# Patient Record
(demographics unavailable — no encounter records)

---

## 2024-12-30 NOTE — PHYSICAL EXAM
[Chaperone Present] : A chaperone was present in the examining room during all aspects of the physical examination [Labia Majora] : normal [Labia Minora] : normal [Discharge] : a  ~M vaginal discharge was present [Moderate] : moderate [White] : white [Curds] : curd-like [Normal] : normal [Uterine Adnexae] : normal

## 2024-12-31 NOTE — PLAN
[FreeTextEntry1] : vaginitis-rx sent-cultures taken rest of exam normal pelvic floor PT-numbers given f/u prn   agree with pa a/p as covering md - nt

## 2024-12-31 NOTE — HISTORY OF PRESENT ILLNESS
[FreeTextEntry1] : 28 yo pp 5months p1001 no meds-stopped bc still feeling vaginal discomfort -with dyspareunia both introital and deep since delivery feels "tight" in addition last few days having a curdy discharge with itching

## 2025-05-22 NOTE — HISTORY OF PRESENT ILLNESS
[FreeTextEntry1] : 29 y/o p1001 LMP 3/9/25 here for evaluation of secondary amenorrhea.  no bleeding, pain , thinks has yeast, took augmentin for 10 days.  nsd x 1, 6-2 lbs  no med or surgical hx nkda  dy neg, declines expanded carrier testing  non smoker

## 2025-05-22 NOTE — PROCEDURE
[Transvaginal OB Sonogram] : Transvaginal OB Sonogram [Intrauterine Pregnancy] : intrauterine pregnancy [Yolk Sac] : yolk sac present [Fetal Heart] : fetal heart present [Transvaginal OB Sonogram WNL] : Transvaginal OB Sonogram WNL [FreeTextEntry1] : single viable iup crl 10.5 wks, no ff, no masses, c/w lmp madhu 12/14/24

## 2025-05-22 NOTE — PLAN
[FreeTextEntry1] : 27 y/o p1 with secondary amenorrhea and vaginitis stable  pn sent from office rx terconazole prenatals ref additional carrier testing or aneuplody testing precautions f/u 4 wks

## 2025-05-22 NOTE — PHYSICAL EXAM
[MA] : MA [FreeTextEntry2] : Nrua [Labia Majora] : normal [Labia Minora] : normal [Normal] : normal [Uterine Adnexae] : normal